# Patient Record
Sex: MALE | Race: BLACK OR AFRICAN AMERICAN
[De-identification: names, ages, dates, MRNs, and addresses within clinical notes are randomized per-mention and may not be internally consistent; named-entity substitution may affect disease eponyms.]

---

## 2019-06-10 ENCOUNTER — HOSPITAL ENCOUNTER (EMERGENCY)
Dept: HOSPITAL 12 - ER | Age: 54
Discharge: HOME | End: 2019-06-10
Payer: SELF-PAY

## 2019-06-10 VITALS — HEIGHT: 72 IN | BODY MASS INDEX: 29.8 KG/M2 | WEIGHT: 220 LBS

## 2019-06-10 VITALS — SYSTOLIC BLOOD PRESSURE: 130 MMHG | DIASTOLIC BLOOD PRESSURE: 75 MMHG

## 2019-06-10 DIAGNOSIS — R06.02: ICD-10-CM

## 2019-06-10 DIAGNOSIS — Z00.00: Primary | ICD-10-CM

## 2019-06-10 DIAGNOSIS — R41.3: ICD-10-CM

## 2019-06-10 LAB
ALP SERPL-CCNC: 59 U/L (ref 50–136)
ALT SERPL W/O P-5'-P-CCNC: 19 U/L (ref 16–63)
AST SERPL-CCNC: 13 U/L (ref 15–37)
BASOPHILS # BLD AUTO: 0 K/UL (ref 0–8)
BASOPHILS NFR BLD AUTO: 0.9 % (ref 0–2)
BILIRUB DIRECT SERPL-MCNC: 0.1 MG/DL (ref 0–0.2)
BILIRUB SERPL-MCNC: 0.6 MG/DL (ref 0.2–1)
BUN SERPL-MCNC: 10 MG/DL (ref 7–18)
CHLORIDE SERPL-SCNC: 105 MMOL/L (ref 98–107)
CO2 SERPL-SCNC: 31 MMOL/L (ref 21–32)
CREAT SERPL-MCNC: 1.1 MG/DL (ref 0.6–1.3)
EOSINOPHIL # BLD AUTO: 0.1 K/UL (ref 0–0.7)
EOSINOPHIL NFR BLD AUTO: 2.3 % (ref 0–7)
GLUCOSE SERPL-MCNC: 133 MG/DL (ref 74–106)
HCT VFR BLD AUTO: 41.6 % (ref 36.7–47.1)
HGB BLD-MCNC: 13.3 G/DL (ref 12.5–16.3)
LYMPHOCYTES # BLD AUTO: 1.1 K/UL (ref 20–40)
LYMPHOCYTES NFR BLD AUTO: 37.7 % (ref 20.5–51.5)
MCH RBC QN AUTO: 29 UUG (ref 23.8–33.4)
MCHC RBC AUTO-ENTMCNC: 32 G/DL (ref 32.5–36.3)
MCV RBC AUTO: 90.4 FL (ref 73–96.2)
MONOCYTES # BLD AUTO: 0.3 K/UL (ref 2–10)
MONOCYTES NFR BLD AUTO: 11.6 % (ref 0–11)
NEUTROPHILS # BLD AUTO: 1.4 K/UL (ref 1.8–8.9)
NEUTROPHILS NFR BLD AUTO: 47.5 % (ref 38.5–71.5)
PLATELET # BLD AUTO: 125 K/UL (ref 152–348)
POTASSIUM SERPL-SCNC: 4.4 MMOL/L (ref 3.5–5.1)
RBC # BLD AUTO: 4.6 MIL/UL (ref 4.06–5.63)
WBC # BLD AUTO: 2.9 K/UL (ref 3.6–10.2)
WS STN SPEC: 7.8 G/DL (ref 6.4–8.2)

## 2019-06-10 PROCEDURE — A4663 DIALYSIS BLOOD PRESSURE CUFF: HCPCS

## 2021-07-22 NOTE — NUR
Patient discharged to home in stable condition.  Written and verbal after care 
instructions given. 

Patient verbalizes understanding of instructions. Stressed follow up or return 
to ER for worsening s/s. Ambulated from ER with stable gait. All belongings 
with patient.